# Patient Record
Sex: FEMALE | Race: WHITE | NOT HISPANIC OR LATINO | Employment: FULL TIME | URBAN - METROPOLITAN AREA
[De-identification: names, ages, dates, MRNs, and addresses within clinical notes are randomized per-mention and may not be internally consistent; named-entity substitution may affect disease eponyms.]

---

## 2017-01-27 ENCOUNTER — OFFICE VISIT (OUTPATIENT)
Dept: DERMATOLOGY | Facility: CLINIC | Age: 57
End: 2017-01-27
Payer: COMMERCIAL

## 2017-01-27 DIAGNOSIS — L23.9 ALLERGIC CONTACT DERMATITIS, UNSPECIFIED TRIGGER: ICD-10-CM

## 2017-01-27 PROCEDURE — 99214 OFFICE O/P EST MOD 30 MIN: CPT | Mod: S$GLB,,, | Performed by: DERMATOLOGY

## 2017-01-27 PROCEDURE — 99999 PR PBB SHADOW E&M-EST. PATIENT-LVL II: CPT | Mod: PBBFAC,,, | Performed by: DERMATOLOGY

## 2017-01-27 PROCEDURE — 1159F MED LIST DOCD IN RCRD: CPT | Mod: S$GLB,,, | Performed by: DERMATOLOGY

## 2017-01-27 NOTE — ASSESSMENT & PLAN NOTE
Today's Plan:      Possible complication with atopic dermatitis  Flared on hands and abdomen  Pt encouraged to stick to safe list of products  Cont locoid oint for face  Needs topical steroid for abdomen and hands - will look up compatible with pt's CARD  Pt to bring safe list to next appt.   Consider MTX    F/u 1 month

## 2017-01-27 NOTE — PROGRESS NOTES
Subjective:       Patient ID:  Selene Espinal is a 56 y.o. female who presents for   Chief Complaint   Patient presents with    Dermatitis     face improving occ flare few spots, seem more on hands and palms few weeks now and very itchy, abdomen x few weeks itchy still using hydro butyrate 2-3 times each day which is helping      HPI Comments: S/p patch testing 9/23/16:     + 4 neomycin and chlorhexidine  +1 dimethylaminopropylamine (DMAPA) and quarternium 15  + 1 - +2 methyldibromo glutaronitrile/phenoxyethanol and iodopropynyl butylcarbamate and oleamidopropyl dimethylamine and coconut diethanolamide (cocamide haleigh)  +2 - +3 cobalt and formaldehyde  +3 decyl glucoside     Has eliminated some, but not all allergens    Dermatitis  - Follow-up  Diagnosis: ACD.  Symptom course: worsening (flaring shaggy on hands and abdomen)  Currently using: locoid oint bid to face and hands.  Affected locations: face, left hand, right hand and abdomen  Signs / symptoms: itching (severe)        Review of Systems   HENT: Positive for congestion and postnasal drip.    Eyes: Positive for itching.   Skin: Positive for itching and rash.   Hematologic/Lymphatic: Does not bruise/bleed easily.   Allergic/Immunologic: Positive for environmental allergies (take antihis regularly -- skin gets worse when allergies flare).        Objective:    Physical Exam   Constitutional: She appears well-developed and well-nourished. She is obese.  No distress.   Neurological: She is alert and oriented to person, place, and time. She is not disoriented.   Psychiatric: She has a normal mood and affect.   Skin:   Areas Examined (abnormalities noted in diagram):   Head / Face Inspection Performed  Neck Inspection Performed  Chest / Axilla Inspection Performed  Abdomen Inspection Performed  Back Inspection Performed  RUE Inspected  LUE Inspection Performed  Nails and Digits Inspection Performed                       Diagram Legend     Erythematous scaling  macule/papule c/w actinic keratosis       Vascular papule c/w angioma      Pigmented verrucoid papule/plaque c/w seborrheic keratosis      Yellow umbilicated papule c/w sebaceous hyperplasia      Irregularly shaped tan macule c/w lentigo     1-2 mm smooth white papules consistent with Milia      Movable subcutaneous cyst with punctum c/w epidermal inclusion cyst      Subcutaneous movable cyst c/w pilar cyst      Firm pink to brown papule c/w dermatofibroma      Pedunculated fleshy papule(s) c/w skin tag(s)      Evenly pigmented macule c/w junctional nevus     Mildly variegated pigmented, slightly irregular-bordered macule c/w mildly atypical nevus      Flesh colored to evenly pigmented papule c/w intradermal nevus       Pink pearly papule/plaque c/w basal cell carcinoma      Erythematous hyperkeratotic cursted plaque c/w SCC      Surgical scar with no sign of skin cancer recurrence      Open and closed comedones      Inflammatory papules and pustules      Verrucoid papule consistent consistent with wart     Erythematous eczematous patches and plaques     Dystrophic onycholytic nail with subungual debris c/w onychomycosis     Umbilicated papule    Erythematous-base heme-crusted tan verrucoid plaque consistent with inflamed seborrheic keratosis     Erythematous Silvery Scaling Plaque c/w Psoriasis     See annotation      Assessment / Plan:        Allergic contact dermatitis, unspecified trigger  -     CBC auto differential; Future  -     Comprehensive metabolic panel; Future  -     Quantiferon Gold TB; Future  -     Hepatitis C antibody; Future  -     Hepatitis B surface antigen; Future  -     Hepatitis B surface antibody; Future    Allergic contact dermatitis  Today's Plan:      Possible complication with atopic dermatitis  Flared on hands and abdomen  Pt encouraged to stick to safe list of products  Cont locoid oint for face  Needs topical steroid for abdomen and hands - will look up compatible with pt's CARD  Pt to  bring safe list to next appt.   Consider MTX    F/u 1 month        Return in about 4 weeks (around 2/24/2017).

## 2017-03-09 ENCOUNTER — OFFICE VISIT (OUTPATIENT)
Dept: DERMATOLOGY | Facility: CLINIC | Age: 57
End: 2017-03-09
Payer: COMMERCIAL

## 2017-03-09 ENCOUNTER — LAB VISIT (OUTPATIENT)
Dept: LAB | Facility: HOSPITAL | Age: 57
End: 2017-03-09
Attending: DERMATOLOGY
Payer: COMMERCIAL

## 2017-03-09 DIAGNOSIS — L23.9 ALLERGIC CONTACT DERMATITIS, UNSPECIFIED TRIGGER: ICD-10-CM

## 2017-03-09 PROCEDURE — 99999 PR PBB SHADOW E&M-EST. PATIENT-LVL II: CPT | Mod: PBBFAC,,, | Performed by: DERMATOLOGY

## 2017-03-09 PROCEDURE — 99214 OFFICE O/P EST MOD 30 MIN: CPT | Mod: S$GLB,,, | Performed by: DERMATOLOGY

## 2017-03-09 PROCEDURE — 1160F RVW MEDS BY RX/DR IN RCRD: CPT | Mod: S$GLB,,, | Performed by: DERMATOLOGY

## 2017-03-09 PROCEDURE — 86480 TB TEST CELL IMMUN MEASURE: CPT

## 2017-03-09 RX ORDER — CLOBETASOL PROPIONATE 0.5 MG/G
OINTMENT TOPICAL
Qty: 60 G | Refills: 3 | Status: SHIPPED | OUTPATIENT
Start: 2017-03-09

## 2017-03-09 RX ORDER — HYDROCORTISONE BUTYRATE 1 MG/G
OINTMENT TOPICAL
Qty: 60 G | Refills: 3 | Status: SHIPPED | OUTPATIENT
Start: 2017-03-09

## 2017-03-09 RX ORDER — HYDROXYZINE HYDROCHLORIDE 25 MG/1
25 TABLET, FILM COATED ORAL NIGHTLY
Qty: 30 TABLET | Refills: 2 | Status: SHIPPED | OUTPATIENT
Start: 2017-03-09 | End: 2017-04-08

## 2017-03-09 RX ORDER — IBUPROFEN 800 MG/1
TABLET ORAL
Refills: 0 | COMMUNITY
Start: 2016-12-05

## 2017-03-09 NOTE — ASSESSMENT & PLAN NOTE
"Today's Plan:      Pt seems to have overlap of ACD and AD. Skin flares with "sinus congestion".  Also with component of LSC/neurotic excoriations - scalp, post neck, left chest and abdomen  Discussed good skin care regimen including using a mild soap and moisturizing cream 1 - 2 times per day. Limit to one bath/shower per day and use lukewarm (not hot) water.     Discussed with patient the importance of not manipulating skin lesions. Trauma often exacerbates condition. Trimming nails is recommended to avoid puncturing skin.     Use ice to relieve intense pruritus.    clob oint for hands bid  Cont locoid oint bid for face  If labs nl, will start MTX 10mg qweek and folic acid qd  Stick to safe list  "

## 2017-03-09 NOTE — MR AVS SNAPSHOT
[facility]  [address]  [facility] [address]  Phone: [phone]  Fax: [phone]                  Selene Espinal   3/9/2017 1:20 PM   Office Visit    Description:  Female : 1960   Provider:  Ana Rocha MD   Department:  [facility]           Reason for Visit     Follow-up           Diagnoses this Visit        Comments    Allergic contact dermatitis, unspecified trigger                To Do List           Goals (5 Years of Data)     None      Follow-Up and Disposition     Return in about 4 weeks (around 2017) for with labs.    Follow-up and Disposition History       These Medications        Disp Refills Start End    clobetasol 0.05% (TEMOVATE) 0.05 % Oint 60 g 3 3/9/2017     AAA hands bid    Pharmacy: [facility] [address] AT [address] [facility] Ph #: [phone]       hydrocortisone butyrate (LOCOID) 0.1 % Oint 60 g 3 3/9/2017     AAA face bid x 1 month    Pharmacy: [facility] [address] AT [address] [facility] Ph #: [phone]       hydrOXYzine HCl (ATARAX) 25 MG tablet 30 tablet 2 3/9/2017 2017    Take 1 tablet (25 mg total) by mouth every evening. Prn pruritus. Do not drive or operate machinery while taking this medicine. - Oral    Pharmacy: [facility] [address] AT [address] [facility] Ph #: [phone]         [facility]     [facility] On Call Nurse Care Line -  Assistance  Registered nurses in the [facility] provide clinical advisement, health education, appointment booking, and other advisory services.  Call for this free service at [phone].             Medications           Message regarding Medications     Verify the changes and/or additions to your medication regime listed below are the same as discussed with your clinician today.  If any of these changes or  additions are incorrect, please notify your healthcare provider.        START taking these NEW medications        Refills    clobetasol 0.05% (TEMOVATE) 0.05 % Oint 3    Sig: AAA hands bid    Class: Normal    hydrOXYzine HCl (ATARAX) 25 MG tablet 2    Sig: Take 1 tablet (25 mg total) by mouth every evening. Prn pruritus. Do not drive or operate machinery while taking this medicine.    Class: Normal    Route: Oral           Verify that the below list of medications is an accurate representation of the medications you are currently taking.  If none reported, the list may be blank. If incorrect, please contact your healthcare provider. Carry this list with you in case of emergency.           Current Medications     atenolol (TENORMIN) 25 MG tablet     hydrocortisone butyrate (LOCOID) 0.1 % Oint AAA face bid x 1 month    hydrOXYzine HCl (ATARAX) 10 MG Tab TK 1 T PO QID PRN    ibuprofen (ADVIL,MOTRIN) 800 MG tablet TK 1 T PO Q 6 TO 8 H PRN INFLAMMATION    losartan (COZAAR) 50 MG tablet TK 1 T PO QD    paroxetine (PAXIL) 40 MG tablet TK 1 T PO QD    SYNTHROID 112 mcg tablet TK 1 T PO QD    clobetasol 0.05% (TEMOVATE) 0.05 % Oint AAA hands bid    hydrOXYzine HCl (ATARAX) 25 MG tablet Take 1 tablet (25 mg total) by mouth every evening. Prn pruritus. Do not drive or operate machinery while taking this medicine.           Clinical Reference Information           Allergies as of 3/9/2017     Sulfa (Sulfonamide Antibiotics)      Immunizations Administered on Date of Encounter - 3/9/2017     None      [facility] Sign-Up     Activating your [facility] account is as easy as 1-2-3!     1) Visit [email or web address], select Sign Up Now, enter this activation code and your date of birth, then select Next.  [email or web address]  Expires: 4/23/2017  2:17 PM      2) Create a username and password to use when you visit [facility] in the future and select a security question in case you lose your password and select Next.    3) Enter your e-mail  address and click Sign Up!    Additional Information  If you have questions, please e-mail [email or web address] or call [phone] to talk to our [facility] staff. Remember, [facility] is NOT to be used for urgent needs. For medical emergencies, dial 911.         Instructions    Discussed good skin care regimen including using a mild soap and moisturizing cream 1 - 2 times per day. Limit to one bath/shower per day and use lukewarm (not hot) water.     Discussed with patient the importance of not manipulating skin lesions. Trauma often exacerbates condition. Trimming nails is recommended to avoid puncturing skin.     Use ice to relieve intense pruritus.    Start zyrtec qdGrabit       Language Assistance Services     ATTENTION: Language assistance services are available, free of charge. Please call [phone].      ATENCIÓN: Si sarah perez, tiene a holder disposición servicios gratuitos de asistencia lingüística. Llame al [phone].     REYNALDO Ý: N?u b?n nói Ti?ng Vi?t, có các d?ch v? h? tr? ngôn ng? mi?n phí dành cho b?n. G?i s? [phone].         [facility] complies with applicable Federal civil rights laws and does not discriminate on the basis of race, color, national origin, age, disability, or sex.

## 2017-03-09 NOTE — PROGRESS NOTES
Subjective:       Patient ID:  Selene Espinal is a 56 y.o. female who presents for   Chief Complaint   Patient presents with    Follow-up     (dermatitis) currently flare x started few weeks ago-face both arms hands abdomen very itchy taking benadryl 1-2 pills per day      HPI Comments: S/p patch testing 9/23/16:     + 4 neomycin and chlorhexidine  +1 dimethylaminopropylamine (DMAPA) and quarternium 15  + 1 - +2 methyldibromo glutaronitrile/phenoxyethanol and iodopropynyl butylcarbamate and oleamidopropyl dimethylamine and coconut diethanolamide (cocamide haleigh)  +2 - +3 cobalt and formaldehyde  +3 decyl glucoside     Has eliminated some, but not all allergens    Dermatitis  - Follow-up  Diagnosis: ACD.  Symptom course: worsening (flaring shaggy on hands and abdomen)  Currently using: locoid oint bid to face and hands.  Affected locations: face, left hand, right hand and abdomen  Signs / symptoms: itching (severe)        Review of Systems   Skin: Positive for itching, rash and activity-related sunscreen use. Negative for sun sensitivity.        Objective:    Physical Exam   Constitutional: She appears well-developed and well-nourished. She is obese.  No distress.   Neurological: She is alert and oriented to person, place, and time. She is not disoriented.   Psychiatric: She has a normal mood and affect.   Skin:   Areas Examined (abnormalities noted in diagram):   Head / Face Inspection Performed  Neck Inspection Performed  Chest / Axilla Inspection Performed  Abdomen Inspection Performed  Back Inspection Performed  RUE Inspected  LUE Inspection Performed  Nails and Digits Inspection Performed                       Diagram Legend     Erythematous scaling macule/papule c/w actinic keratosis       Vascular papule c/w angioma      Pigmented verrucoid papule/plaque c/w seborrheic keratosis      Yellow umbilicated papule c/w sebaceous hyperplasia      Irregularly shaped tan macule c/w lentigo     1-2 mm smooth white papules  "consistent with Milia      Movable subcutaneous cyst with punctum c/w epidermal inclusion cyst      Subcutaneous movable cyst c/w pilar cyst      Firm pink to brown papule c/w dermatofibroma      Pedunculated fleshy papule(s) c/w skin tag(s)      Evenly pigmented macule c/w junctional nevus     Mildly variegated pigmented, slightly irregular-bordered macule c/w mildly atypical nevus      Flesh colored to evenly pigmented papule c/w intradermal nevus       Pink pearly papule/plaque c/w basal cell carcinoma      Erythematous hyperkeratotic cursted plaque c/w SCC      Surgical scar with no sign of skin cancer recurrence      Open and closed comedones      Inflammatory papules and pustules      Verrucoid papule consistent consistent with wart     Erythematous eczematous patches and plaques     Dystrophic onycholytic nail with subungual debris c/w onychomycosis     Umbilicated papule    Erythematous-base heme-crusted tan verrucoid plaque consistent with inflamed seborrheic keratosis     Erythematous Silvery Scaling Plaque c/w Psoriasis     See annotation    Lab Results   Component Value Date    WBC 8.78 03/09/2017    HGB 12.1 03/09/2017    HCT 39.0 03/09/2017    MCV 88 03/09/2017     03/09/2017         Assessment / Plan:        Allergic contact dermatitis, unspecified trigger/Atopic derm overlap  -     clobetasol 0.05% (TEMOVATE) 0.05 % Oint; AAA hands bid  Dispense: 60 g; Refill: 3  -     hydrocortisone butyrate (LOCOID) 0.1 % Oint; AAA face bid x 1 month  Dispense: 60 g; Refill: 3  -     hydrOXYzine HCl (ATARAX) 25 MG tablet; Take 1 tablet (25 mg total) by mouth every evening. Prn pruritus. Do not drive or operate machinery while taking this medicine.  Dispense: 30 tablet; Refill: 2      Allergic contact dermatitis  Today's Plan:      Pt seems to have overlap of ACD and AD. Skin flares with "sinus congestion".  Also with component of LSC/neurotic excoriations - scalp, post neck, left chest and abdomen  Discussed " good skin care regimen including using a mild soap and moisturizing cream 1 - 2 times per day. Limit to one bath/shower per day and use lukewarm (not hot) water.     Discussed with patient the importance of not manipulating skin lesions. Trauma often exacerbates condition. Trimming nails is recommended to avoid puncturing skin.     Use ice to relieve intense pruritus.    clob oint for hands bid  Cont locoid oint bid for face  If labs nl, will start MTX 10mg qweek and folic acid qd  Stick to safe list      Return in about 4 weeks (around 4/6/2017) for with labs.

## 2017-03-09 NOTE — PATIENT INSTRUCTIONS
Discussed good skin care regimen including using a mild soap and moisturizing cream 1 - 2 times per day. Limit to one bath/shower per day and use lukewarm (not hot) water.     Discussed with patient the importance of not manipulating skin lesions. Trauma often exacerbates condition. Trimming nails is recommended to avoid puncturing skin.     Use ice to relieve intense pruritus.    Start zyrtec qday

## 2017-03-11 LAB
MITOGEN NIL: >10 IU/ML
NIL: 0.03 IU/ML
TB ANTIGEN NIL: 0.01 IU/ML
TB ANTIGEN: 0.04 IU/ML
TB GOLD: NEGATIVE

## 2017-03-12 DIAGNOSIS — L20.9 ATOPIC DERMATITIS, UNSPECIFIED TYPE: Primary | ICD-10-CM

## 2017-03-12 RX ORDER — FOLIC ACID 1 MG/1
TABLET ORAL
Qty: 30 TABLET | Refills: 5 | Status: SHIPPED | OUTPATIENT
Start: 2017-03-12 | End: 2017-10-04 | Stop reason: SDUPTHER

## 2017-03-12 RX ORDER — METHOTREXATE 2.5 MG/1
TABLET ORAL
Qty: 16 TABLET | Refills: 2 | Status: SHIPPED | OUTPATIENT
Start: 2017-03-12 | End: 2017-06-04 | Stop reason: SDUPTHER

## 2017-03-13 ENCOUNTER — TELEPHONE (OUTPATIENT)
Dept: DERMATOLOGY | Facility: CLINIC | Age: 57
End: 2017-03-13

## 2017-03-13 NOTE — TELEPHONE ENCOUNTER
Spoke to pt.Informed pt that Medications MTX-10mg qweek and folic acid qday has been sent to Pharmacy. pt already picked up Rx's x2.Scheduled labs and f/u in 1 month.

## 2017-03-13 NOTE — TELEPHONE ENCOUNTER
----- Message from Ana Rocha MD sent at 3/12/2017 11:21 AM CDT -----  Labs nl. Sent MTX at 10mg qweek and folic acid qday. Needs appt and cbc and lft's in 1 month

## 2017-04-21 ENCOUNTER — LAB VISIT (OUTPATIENT)
Dept: LAB | Facility: HOSPITAL | Age: 57
End: 2017-04-21
Attending: DERMATOLOGY
Payer: COMMERCIAL

## 2017-04-21 ENCOUNTER — OFFICE VISIT (OUTPATIENT)
Dept: DERMATOLOGY | Facility: CLINIC | Age: 57
End: 2017-04-21
Payer: COMMERCIAL

## 2017-04-21 DIAGNOSIS — L20.9 ATOPIC DERMATITIS, UNSPECIFIED TYPE: ICD-10-CM

## 2017-04-21 DIAGNOSIS — Z79.899 ENCOUNTER FOR LONG-TERM (CURRENT) USE OF HIGH-RISK MEDICATION: Primary | ICD-10-CM

## 2017-04-21 DIAGNOSIS — L23.9 ALLERGIC CONTACT DERMATITIS, UNSPECIFIED TRIGGER: ICD-10-CM

## 2017-04-21 LAB
ALBUMIN SERPL BCP-MCNC: 3.8 G/DL
ALP SERPL-CCNC: 70 U/L
ALT SERPL W/O P-5'-P-CCNC: 21 U/L
AST SERPL-CCNC: 16 U/L
BASOPHILS # BLD AUTO: 0.01 K/UL
BASOPHILS NFR BLD: 0.1 %
BILIRUB DIRECT SERPL-MCNC: 0.1 MG/DL
BILIRUB SERPL-MCNC: 0.2 MG/DL
DIFFERENTIAL METHOD: ABNORMAL
EOSINOPHIL # BLD AUTO: 0.1 K/UL
EOSINOPHIL NFR BLD: 1.3 %
ERYTHROCYTE [DISTWIDTH] IN BLOOD BY AUTOMATED COUNT: 15.1 %
HCT VFR BLD AUTO: 36.1 %
HGB BLD-MCNC: 11.7 G/DL
LYMPHOCYTES # BLD AUTO: 2.7 K/UL
LYMPHOCYTES NFR BLD: 39.6 %
MCH RBC QN AUTO: 27.9 PG
MCHC RBC AUTO-ENTMCNC: 32.4 %
MCV RBC AUTO: 86 FL
MONOCYTES # BLD AUTO: 0.4 K/UL
MONOCYTES NFR BLD: 5.7 %
NEUTROPHILS # BLD AUTO: 3.7 K/UL
NEUTROPHILS NFR BLD: 53 %
PLATELET # BLD AUTO: 289 K/UL
PMV BLD AUTO: 10.2 FL
PROT SERPL-MCNC: 7.7 G/DL
RBC # BLD AUTO: 4.2 M/UL
WBC # BLD AUTO: 6.9 K/UL

## 2017-04-21 PROCEDURE — 85025 COMPLETE CBC W/AUTO DIFF WBC: CPT

## 2017-04-21 PROCEDURE — 99999 PR PBB SHADOW E&M-EST. PATIENT-LVL II: CPT | Mod: PBBFAC,,, | Performed by: DERMATOLOGY

## 2017-04-21 PROCEDURE — 80076 HEPATIC FUNCTION PANEL: CPT

## 2017-04-21 PROCEDURE — 1160F RVW MEDS BY RX/DR IN RCRD: CPT | Mod: S$GLB,,, | Performed by: DERMATOLOGY

## 2017-04-21 PROCEDURE — 99214 OFFICE O/P EST MOD 30 MIN: CPT | Mod: S$GLB,,, | Performed by: DERMATOLOGY

## 2017-04-21 PROCEDURE — 36415 COLL VENOUS BLD VENIPUNCTURE: CPT

## 2017-04-21 RX ORDER — METHOTREXATE 2.5 MG/1
TABLET ORAL
Qty: 16 TABLET | Refills: 2 | Status: SHIPPED | OUTPATIENT
Start: 2017-04-21 | End: 2017-08-22 | Stop reason: SDUPTHER

## 2017-04-21 NOTE — PROGRESS NOTES
Subjective:       Patient ID:  Selene Espinal is a 56 y.o. female who presents for   Chief Complaint   Patient presents with    Dermatitis     follow up     HPI Comments: Also with h/o neurotic excoriations on abdomen. BRAYDON MCFADDEN helped on chest and scalp    Pt continues to pick 2/2 work stress    Dermatitis  - Follow-up  Diagnosis: AD with ACD.  Symptom course: improving  Currently using: clob oint for hands. not using topicals on face. started MTX 10mg qweek.  Affected locations: left hand  Signs / symptoms: itching (minimal on hands)        Review of Systems   Skin: Positive for rash and activity-related sunscreen use. Negative for itching and sun sensitivity.   Psychiatric/Behavioral: Positive for high stress (on paxil x years).        Objective:    Physical Exam   Constitutional: She appears well-developed and well-nourished. She is obese.  No distress.   Neurological: She is alert and oriented to person, place, and time. She is not disoriented.   Psychiatric: She has a normal mood and affect.   Skin:   Areas Examined (abnormalities noted in diagram):   Head / Face Inspection Performed  Neck Inspection Performed  Chest / Axilla Inspection Performed  Back Inspection Performed  RUE Inspected  LUE Inspection Performed                       Diagram Legend     Erythematous scaling macule/papule c/w actinic keratosis       Vascular papule c/w angioma      Pigmented verrucoid papule/plaque c/w seborrheic keratosis      Yellow umbilicated papule c/w sebaceous hyperplasia      Irregularly shaped tan macule c/w lentigo     1-2 mm smooth white papules consistent with Milia      Movable subcutaneous cyst with punctum c/w epidermal inclusion cyst      Subcutaneous movable cyst c/w pilar cyst      Firm pink to brown papule c/w dermatofibroma      Pedunculated fleshy papule(s) c/w skin tag(s)      Evenly pigmented macule c/w junctional nevus     Mildly variegated pigmented, slightly irregular-bordered macule c/w mildly atypical  nevus      Flesh colored to evenly pigmented papule c/w intradermal nevus       Pink pearly papule/plaque c/w basal cell carcinoma      Erythematous hyperkeratotic cursted plaque c/w SCC      Surgical scar with no sign of skin cancer recurrence      Open and closed comedones      Inflammatory papules and pustules      Verrucoid papule consistent consistent with wart     Erythematous eczematous patches and plaques     Dystrophic onycholytic nail with subungual debris c/w onychomycosis     Umbilicated papule    Erythematous-base heme-crusted tan verrucoid plaque consistent with inflamed seborrheic keratosis     Erythematous Silvery Scaling Plaque c/w Psoriasis     See annotation    Lab Results   Component Value Date    ALT 21 04/21/2017    AST 16 04/21/2017    ALKPHOS 70 04/21/2017    BILITOT 0.2 04/21/2017     Lab Results   Component Value Date    WBC 6.90 04/21/2017    HGB 11.7 (L) 04/21/2017    HCT 36.1 (L) 04/21/2017    MCV 86 04/21/2017     04/21/2017       Assessment / Plan:        Allergic contact dermatitis, unspecified trigger  -     CBC auto differential; Future  -     methotrexate 2.5 MG Tab; Take 4 tablets po qweek in divided doses as directed  Dispense: 16 tablet; Refill: 2      Allergic contact dermatitis  Today's Plan:      Improved  Cont MTX at 10mg qweek. Cont folic acid  Cont allergen avoidance  D/c picking at skin  Cont clob oint for hands and   Cont locoid oint bid prn flare to face    Needs cbc in 1 month. Needs f/u with me with labs in 2 months        Return in about 2 months (around 6/21/2017) for with labs.

## 2017-04-21 NOTE — ASSESSMENT & PLAN NOTE
Today's Plan:      Improved  Cont MTX at 10mg qweek. Cont folic acid  Cont allergen avoidance  D/c picking at skin  Cont clob oint for hands and   Cont locoid oint bid prn flare to face  Rec hand cream off of CARD list    Needs cbc in 1 month. Needs f/u with me with labs in 2 months

## 2017-06-04 DIAGNOSIS — L20.9 ATOPIC DERMATITIS, UNSPECIFIED TYPE: ICD-10-CM

## 2017-06-04 RX ORDER — METHOTREXATE 2.5 MG/1
TABLET ORAL
Qty: 16 TABLET | Refills: 1 | Status: SHIPPED | OUTPATIENT
Start: 2017-06-04 | End: 2017-12-27 | Stop reason: SDUPTHER

## 2017-06-06 ENCOUNTER — TELEPHONE (OUTPATIENT)
Dept: DERMATOLOGY | Facility: CLINIC | Age: 57
End: 2017-06-06

## 2017-06-06 NOTE — TELEPHONE ENCOUNTER
LVM for pt to return call to set up labs/or did pt have them done else where outside [facility], results will need to be fax prior to pt follow up visit with Dr. Rocha.

## 2017-06-12 ENCOUNTER — TELEPHONE (OUTPATIENT)
Dept: DERMATOLOGY | Facility: CLINIC | Age: 57
End: 2017-06-12

## 2017-06-12 NOTE — TELEPHONE ENCOUNTER
----- Message from Jennifer Leigh sent at 6/12/2017  9:24 AM CDT -----  Contact: pt   JAM-pt- pt is returning Tabatha's call pt is asking if they received her blood work. Pt said it was faxed over. Can you please call pt at [phone]    MADELIN

## 2017-06-12 NOTE — TELEPHONE ENCOUNTER
Spoke to pt.lab results will be refax today, we have not received recent labs from pt, labs are done outside [facility].

## 2017-07-14 ENCOUNTER — OFFICE VISIT (OUTPATIENT)
Dept: DERMATOLOGY | Facility: CLINIC | Age: 57
End: 2017-07-14
Payer: COMMERCIAL

## 2017-07-14 DIAGNOSIS — L28.1 PRURIGO NODULARIS: ICD-10-CM

## 2017-07-14 DIAGNOSIS — L23.9 ALLERGIC CONTACT DERMATITIS, UNSPECIFIED TRIGGER: ICD-10-CM

## 2017-07-14 DIAGNOSIS — L20.9 ATOPIC DERMATITIS, UNSPECIFIED TYPE: Primary | ICD-10-CM

## 2017-07-14 PROCEDURE — 99999 PR PBB SHADOW E&M-EST. PATIENT-LVL II: CPT | Mod: PBBFAC,,, | Performed by: DERMATOLOGY

## 2017-07-14 PROCEDURE — 99214 OFFICE O/P EST MOD 30 MIN: CPT | Mod: 25,S$GLB,, | Performed by: DERMATOLOGY

## 2017-07-14 PROCEDURE — 11900 INJECT SKIN LESIONS </W 7: CPT | Mod: S$GLB,,, | Performed by: DERMATOLOGY

## 2017-07-14 RX ORDER — DUPILUMAB 300 MG/2ML
300 INJECTION, SOLUTION SUBCUTANEOUS
Qty: 4 ML | Refills: 3 | Status: SHIPPED | OUTPATIENT
Start: 2017-07-14 | End: 2017-10-31

## 2017-07-14 RX ORDER — DUPILUMAB 300 MG/2ML
INJECTION, SOLUTION SUBCUTANEOUS
Qty: 8 ML | Refills: 0 | Status: SHIPPED | OUTPATIENT
Start: 2017-07-14 | End: 2017-10-31

## 2017-07-14 NOTE — ASSESSMENT & PLAN NOTE
Today's Plan:      Clinical picture today more c/w atopic dermatitis  Itch score: 10/10  Sleep score: 0/10 (takes atarax prior to sleep)    Cont MTX at 10mg qweek and folic acid qday  Start Dupixent 600mg SQ day 1 then 300mg SQ qoweek  Cont topicals - clob oint and locoid oint    F/u 1 month

## 2017-07-14 NOTE — PROGRESS NOTES
Subjective:       Patient ID:  Selene Espinal is a 56 y.o. female who presents for   Chief Complaint   Patient presents with    Follow-up     allergic contact derm-both arms itching x few weeks, recheck abdomen currently MTX      Dermatitis  - Follow-up  Diagnosis: AD with ACD.  Symptom course: improving (inital ulcerated lesions to abdomen and dermatitis to hands resolved- new rash to bilateral forearms x3 weeks)  Currently using: clob ointment to hands and forearms. Currently on MTX 10mgqweek and Atarax qohs.  Affected locations: left arm, right arm, abdomen and face  Signs / symptoms: itching, redness and scabs  Severity: mild        Review of Systems   Skin: Positive for itching (Bilateral forearms), rash and activity-related sunscreen use. Negative for daily sunscreen use and recent sunburn.   Hematologic/Lymphatic: Does not bruise/bleed easily.        Objective:    Physical Exam   Constitutional: She appears well-developed and well-nourished. She is obese.  No distress.   Neurological: She is alert and oriented to person, place, and time. She is not disoriented.   Psychiatric: She has a normal mood and affect.   Skin:   Areas Examined (abnormalities noted in diagram):   Head / Face Inspection Performed  Neck Inspection Performed  Chest / Axilla Inspection Performed  Abdomen Inspection Performed  Back Inspection Performed  RUE Inspected  LUE Inspection Performed                       Diagram Legend     Erythematous scaling macule/papule c/w actinic keratosis       Vascular papule c/w angioma      Pigmented verrucoid papule/plaque c/w seborrheic keratosis      Yellow umbilicated papule c/w sebaceous hyperplasia      Irregularly shaped tan macule c/w lentigo     1-2 mm smooth white papules consistent with Milia      Movable subcutaneous cyst with punctum c/w epidermal inclusion cyst      Subcutaneous movable cyst c/w pilar cyst      Firm pink to brown papule c/w dermatofibroma      Pedunculated fleshy papule(s)  c/w skin tag(s)      Evenly pigmented macule c/w junctional nevus     Mildly variegated pigmented, slightly irregular-bordered macule c/w mildly atypical nevus      Flesh colored to evenly pigmented papule c/w intradermal nevus       Pink pearly papule/plaque c/w basal cell carcinoma      Erythematous hyperkeratotic cursted plaque c/w SCC      Surgical scar with no sign of skin cancer recurrence      Open and closed comedones      Inflammatory papules and pustules      Verrucoid papule consistent consistent with wart     Erythematous eczematous patches and plaques     Dystrophic onycholytic nail with subungual debris c/w onychomycosis     Umbilicated papule    Erythematous-base heme-crusted tan verrucoid plaque consistent with inflamed seborrheic keratosis     Erythematous Silvery Scaling Plaque c/w Psoriasis     See annotation    Lab Results   Component Value Date    WBC 6.90 04/21/2017    HGB 11.7 (L) 04/21/2017    HCT 36.1 (L) 04/21/2017    MCV 86 04/21/2017     04/21/2017         Assessment / Plan:        Atopic dermatitis, unspecified type  -     DUPIXENT 300 mg/2 mL Syrg; Inject 600mg SQ day 1 then 300mg qoweek  Dispense: 8 mL; Refill: 0  -     DUPIXENT 300 mg/2 mL Syrg; Inject 300 mg into the skin every 14 (fourteen) days.  Dispense: 4 mL; Refill: 3    Allergic contact dermatitis, unspecified trigger    Prurigo nodularis  Intralesional Kenalog 10mg/cc (0.8 cc total) injected into 4 lesions on the upper right back and abdomen and right forearm today after obtaining verbal consent including risk of surrounding hypopigmentation. Patient tolerated procedure well.        Allergic contact dermatitis  Today's Plan:      Clinical picture today more c/w atopic dermatitis  Itch score: 10/10  Sleep score: 0/10 (takes atarax prior to sleep)    Cont MTX at 10mg qweek and folic acid qday  Start Dupixent 600mg SQ day 1 then 300mg SQ qoweek  Cont topicals - clob oint and locoid oint    F/u 1 month      Atopic  dermatitis  Today's Plan:      Clinical picture today more c/w atopic dermatitis  Itch score: 10/10  Sleep score: 0/10 (takes atarax prior to sleep)    Cont MTX at 10mg qweek and folic acid qday  Start Dupixent 600mg SQ day 1 then 300mg SQ qoweek  Cont topicals - clob oint and locoid oint          Return in about 4 weeks (around 8/11/2017) for with labs.

## 2017-07-14 NOTE — ASSESSMENT & PLAN NOTE
Today's Plan:      Clinical picture today more c/w atopic dermatitis  Itch score: 10/10  Sleep score: 0/10 (takes atarax prior to sleep)    Cont MTX at 10mg qweek and folic acid qday  Start Dupixent 600mg SQ day 1 then 300mg SQ qoweek  Cont topicals - clob oint and locoid oint

## 2017-07-17 ENCOUNTER — TELEPHONE (OUTPATIENT)
Dept: PHARMACY | Facility: CLINIC | Age: 57
End: 2017-07-17

## 2017-07-17 NOTE — TELEPHONE ENCOUNTER
Good morning, this is [facility], we have received an order for Dupixent from your provider and will contact you once a benefits investigation is complete with copay information to set up pickup or shipment and [facility] consultation.  feel free to give us a call with  any questions prior to hearing back from us at [phone]. thank you!_SARI 7/17/17

## 2017-07-18 NOTE — ADDENDUM NOTE
Encounter addended by: Bonny Menchaca on: 7/18/2017  3:08 PM<BR>    Actions taken: Sign clinical note

## 2017-07-18 NOTE — TELEPHONE ENCOUNTER
[employer] [place] stated that they will not accept a PA request on a drug until it has been on the market for at least 3 to 6 months so we should try to get MD to change med.

## 2017-08-02 NOTE — TELEPHONE ENCOUNTER
"Good afternoon,    With Ms. Espinal's insurance ([facility]) a prior authorization was not able to be completed (for Dupexant coverage) due to the medication not being on the market for > 6 months. An appeal was completed instead and was denied. The denial stated: "the request is denied. The medication is currently non covered under the member's contract benefits." With no next step listed to re-attempt at coverage. The  also does not have any type of assistance available at this time.     Please advise how you would like us to proceed.    Thanks,   Zahida Barbosa, PharmD  [facility]   "

## 2017-08-22 DIAGNOSIS — L23.9 ALLERGIC CONTACT DERMATITIS, UNSPECIFIED TRIGGER: ICD-10-CM

## 2017-08-22 RX ORDER — METHOTREXATE 2.5 MG/1
TABLET ORAL
Qty: 16 TABLET | Refills: 1 | Status: SHIPPED | OUTPATIENT
Start: 2017-08-22 | End: 2017-12-21 | Stop reason: SDUPTHER

## 2017-09-01 ENCOUNTER — TELEPHONE (OUTPATIENT)
Dept: DERMATOLOGY | Facility: CLINIC | Age: 57
End: 2017-09-01

## 2017-09-01 NOTE — TELEPHONE ENCOUNTER
----- Message from Kylie Freedman sent at 9/1/2017 12:52 PM CDT -----  Contact: Self  Good afternoon,    Pt would like a call back regarding her upcoming appt. Pt stated she forgot about starting the injections in her abdomen.    Pt can be reached at [phone].    Thank you!

## 2017-09-01 NOTE — TELEPHONE ENCOUNTER
Patient called in for update on Dupixent. I informed her that her insurance would not allow a review of the medication until it was on the market for at least 6 months. It was released on 3/28/17 so we can attempt to get Prior Authorization again around 9/29/17. She asked that we please keep her informed of the status.

## 2017-09-05 ENCOUNTER — TELEPHONE (OUTPATIENT)
Dept: DERMATOLOGY | Facility: CLINIC | Age: 57
End: 2017-09-05

## 2017-09-05 NOTE — TELEPHONE ENCOUNTER
----- Message from Ana Rocha MD sent at 9/5/2017  9:17 AM CDT -----  Ok to push it until end of octLazarus CAPONE    ----- Message -----  From: Tabatha Kearney MA  Sent: 9/5/2017   8:58 AM  To: MD Deni Galeano    Mrs. Morton insurance will be able to cover for Dupixent until after September 29th-Medication need to be out on market for at least 6 months.    Pt has labs and f/u scheduled this upcoming Friday, would you like pt to keep appts or be pushed further down after she starts Medication?    Pt only been using topicals creams for the mean time.

## 2017-09-05 NOTE — TELEPHONE ENCOUNTER
Spoke to pt.r/s both appts to the ending of October, due to not starting Medication yet/and ins purposes.Sent reminders to pt.

## 2017-10-02 NOTE — TELEPHONE ENCOUNTER
Yes ma'am, the appeal was resubmitted to [employer] [place] this morning.    We also reached out to the rep for Dupixent and he is seeing if there is anything they can help us with to get this approved and to see what other alternatives are available for her to get the medication.     We will keep you updated.     Thanks,  Zahida Barbosa, PharmD  [facility]

## 2017-10-03 ENCOUNTER — TELEPHONE (OUTPATIENT)
Dept: PHARMACY | Facility: CLINIC | Age: 57
End: 2017-10-03

## 2017-10-03 ENCOUNTER — TELEPHONE (OUTPATIENT)
Dept: DERMATOLOGY | Facility: CLINIC | Age: 57
End: 2017-10-03

## 2017-10-03 NOTE — TELEPHONE ENCOUNTER
----- Message from  Reese sent at 10/3/2017  4:44 PM CDT -----  Contact: pt   NARCISOpt- pt is calling to speak with Tabatha pt said she was suppose to start a new medication pt said her insurance won't pay for it pt needed to ask Tabatha about it if she needs to keep her appt  Can you please call pt at [phone] jc

## 2017-10-03 NOTE — TELEPHONE ENCOUNTER
Spoke with patient, Dr. Rocha wants her to keep scheduled appointment in October with labs.  She is   currently working on prior auth with ins. Company to get Duplixent approved.

## 2017-10-04 DIAGNOSIS — L20.9 ATOPIC DERMATITIS, UNSPECIFIED TYPE: ICD-10-CM

## 2017-10-05 RX ORDER — FOLIC ACID 1 MG/1
TABLET ORAL
Qty: 30 TABLET | Refills: 0 | Status: SHIPPED | OUTPATIENT
Start: 2017-10-05 | End: 2017-11-05 | Stop reason: SDUPTHER

## 2017-10-26 ENCOUNTER — TELEPHONE (OUTPATIENT)
Dept: DERMATOLOGY | Facility: CLINIC | Age: 57
End: 2017-10-26

## 2017-10-26 NOTE — TELEPHONE ENCOUNTER
----- Message from Keila Gracia sent at 10/26/2017  1:14 PM CDT -----  Contact: patient   Please call patient need to reschedule lab and doctor appointment have an emergency waiting on a call from the nurse

## 2017-10-31 ENCOUNTER — TELEPHONE (OUTPATIENT)
Dept: DERMATOLOGY | Facility: CLINIC | Age: 57
End: 2017-10-31

## 2017-10-31 DIAGNOSIS — L20.9 ATOPIC DERMATITIS, UNSPECIFIED TYPE: ICD-10-CM

## 2017-10-31 RX ORDER — DUPILUMAB 300 MG/2ML
INJECTION, SOLUTION SUBCUTANEOUS
Qty: 4 ML | Refills: 2 | Status: SHIPPED | OUTPATIENT
Start: 2017-10-31

## 2017-10-31 RX ORDER — DUPILUMAB 300 MG/2ML
INJECTION, SOLUTION SUBCUTANEOUS
Qty: 8 ML | Refills: 0 | Status: SHIPPED | OUTPATIENT
Start: 2017-10-31

## 2017-10-31 NOTE — TELEPHONE ENCOUNTER
Spoke with patient, notified her that she was approved for the Dupexient.  Reviewed instructions on how to use medication and patient will call when she gets it to verify dosage again.

## 2017-10-31 NOTE — TELEPHONE ENCOUNTER
FOR DOCUMENTATION ONLY:  Dupixent prior authorization approved x 6 months  10/31/17 through 4/30/18  Case ID: [id number]    [facility] not in network. Insurance suggested [facility]. Estimated copay of $200. Will obtain copay card & reach out to patient.

## 2017-10-31 NOTE — TELEPHONE ENCOUNTER
FOR DOCUMENTATION ONLY:  Financial Assistance for Dupixent approved  Source: Copay Card  BIN: [id number]  PCN: PARMINDER  ID: [id number]  Group: [id number]  $0.00 copay    Must use [facility]  Ph: [phone]  Fx: [phone]

## 2017-11-02 ENCOUNTER — TELEPHONE (OUTPATIENT)
Dept: DERMATOLOGY | Facility: CLINIC | Age: 57
End: 2017-11-02

## 2017-11-02 NOTE — TELEPHONE ENCOUNTER
----- Message from Keila Gracia sent at 11/2/2017  8:53 AM CDT -----  Contact: Pharmacy [facility]  called  The Pharmacy called [facility] Need ICD-10 Code for above patient on medication Dupixent-the number to pharmacy is [phone]

## 2017-11-05 DIAGNOSIS — L20.9 ATOPIC DERMATITIS, UNSPECIFIED TYPE: ICD-10-CM

## 2017-11-06 RX ORDER — FOLIC ACID 1 MG/1
TABLET ORAL
Qty: 30 TABLET | Refills: 0 | Status: SHIPPED | OUTPATIENT
Start: 2017-11-06

## 2017-12-18 ENCOUNTER — TELEPHONE (OUTPATIENT)
Dept: DERMATOLOGY | Facility: CLINIC | Age: 57
End: 2017-12-18

## 2017-12-18 NOTE — TELEPHONE ENCOUNTER
----- Message from Saud Bronson sent at 12/18/2017  8:40 AM CST -----  Contact: PT   Pt requesting to speak to staff regarding dupixent injections, states that she has completed her first month and would like to know how much longer she should keep administering, if at all. Please call pt at [phone] for clarity

## 2017-12-18 NOTE — TELEPHONE ENCOUNTER
Spoke to pt.Confirmed Medication instructions been on Dupixent for 1 month now on maintenance dose.Next f/u and labs on 12/29 to f/u.Sent reminder to pt.

## 2017-12-21 DIAGNOSIS — L23.9 ALLERGIC CONTACT DERMATITIS, UNSPECIFIED TRIGGER: ICD-10-CM

## 2017-12-22 RX ORDER — METHOTREXATE 2.5 MG/1
TABLET ORAL
Qty: 16 TABLET | Refills: 0 | Status: SHIPPED | OUTPATIENT
Start: 2017-12-22

## 2017-12-27 DIAGNOSIS — L20.9 ATOPIC DERMATITIS, UNSPECIFIED TYPE: ICD-10-CM

## 2017-12-27 RX ORDER — METHOTREXATE 2.5 MG/1
TABLET ORAL
Qty: 16 TABLET | Refills: 0 | Status: SHIPPED | OUTPATIENT
Start: 2017-12-27 | End: 2018-03-15 | Stop reason: SDUPTHER

## 2017-12-29 ENCOUNTER — LAB VISIT (OUTPATIENT)
Dept: LAB | Facility: HOSPITAL | Age: 57
End: 2017-12-29
Attending: DERMATOLOGY
Payer: COMMERCIAL

## 2017-12-29 ENCOUNTER — OFFICE VISIT (OUTPATIENT)
Dept: DERMATOLOGY | Facility: CLINIC | Age: 57
End: 2017-12-29
Payer: COMMERCIAL

## 2017-12-29 DIAGNOSIS — L20.9 ATOPIC DERMATITIS, UNSPECIFIED TYPE: Primary | ICD-10-CM

## 2017-12-29 DIAGNOSIS — Z79.899 ENCOUNTER FOR LONG-TERM (CURRENT) USE OF HIGH-RISK MEDICATION: ICD-10-CM

## 2017-12-29 DIAGNOSIS — L23.9 ALLERGIC CONTACT DERMATITIS, UNSPECIFIED TRIGGER: ICD-10-CM

## 2017-12-29 DIAGNOSIS — L98.1 NEUROTIC EXCORIATIONS: ICD-10-CM

## 2017-12-29 LAB
ALBUMIN SERPL BCP-MCNC: 4.1 G/DL
ALP SERPL-CCNC: 71 U/L
ALT SERPL W/O P-5'-P-CCNC: 27 U/L
AST SERPL-CCNC: 22 U/L
BASOPHILS # BLD AUTO: 0.05 K/UL
BASOPHILS NFR BLD: 0.6 %
BILIRUB DIRECT SERPL-MCNC: 0.1 MG/DL
BILIRUB SERPL-MCNC: 0.3 MG/DL
DIFFERENTIAL METHOD: ABNORMAL
EOSINOPHIL # BLD AUTO: 0.2 K/UL
EOSINOPHIL NFR BLD: 1.7 %
ERYTHROCYTE [DISTWIDTH] IN BLOOD BY AUTOMATED COUNT: 15.6 %
HCT VFR BLD AUTO: 37.5 %
HGB BLD-MCNC: 12.2 G/DL
IMM GRANULOCYTES # BLD AUTO: 0.03 K/UL
IMM GRANULOCYTES NFR BLD AUTO: 0.3 %
LYMPHOCYTES # BLD AUTO: 3.2 K/UL
LYMPHOCYTES NFR BLD: 36.4 %
MCH RBC QN AUTO: 28.8 PG
MCHC RBC AUTO-ENTMCNC: 32.5 G/DL
MCV RBC AUTO: 89 FL
MONOCYTES # BLD AUTO: 0.7 K/UL
MONOCYTES NFR BLD: 7.9 %
NEUTROPHILS # BLD AUTO: 4.6 K/UL
NEUTROPHILS NFR BLD: 53.1 %
NRBC BLD-RTO: 0 /100 WBC
PLATELET # BLD AUTO: 330 K/UL
PMV BLD AUTO: 10.5 FL
PROT SERPL-MCNC: 8.4 G/DL
RBC # BLD AUTO: 4.23 M/UL
WBC # BLD AUTO: 8.7 K/UL

## 2017-12-29 PROCEDURE — 99999 PR PBB SHADOW E&M-EST. PATIENT-LVL II: CPT | Mod: PBBFAC,,, | Performed by: DERMATOLOGY

## 2017-12-29 PROCEDURE — 80076 HEPATIC FUNCTION PANEL: CPT

## 2017-12-29 PROCEDURE — 85025 COMPLETE CBC W/AUTO DIFF WBC: CPT

## 2017-12-29 PROCEDURE — 99214 OFFICE O/P EST MOD 30 MIN: CPT | Mod: 25,S$GLB,, | Performed by: DERMATOLOGY

## 2017-12-29 PROCEDURE — 36415 COLL VENOUS BLD VENIPUNCTURE: CPT

## 2017-12-29 PROCEDURE — 11900 INJECT SKIN LESIONS </W 7: CPT | Mod: S$GLB,,, | Performed by: DERMATOLOGY

## 2017-12-29 NOTE — ASSESSMENT & PLAN NOTE
Today's Plan:      Cont dupixent 300mg SQ qoweek  Cont MTX at 10mg qweek and folic acid  Ok to use clob oint bid for flares on body; ok to use locoid oint bid for flares on face    F/u 3 months

## 2017-12-29 NOTE — PATIENT INSTRUCTIONS
Discussed with patient the importance of not manipulating skin lesions. Trauma often exacerbates condition. Trimming nails is recommended to avoid puncturing skin.     Use ice to relieve intense pruritus.

## 2017-12-29 NOTE — PROGRESS NOTES
Subjective:       Patient ID:  Selene Espinal is a 57 y.o. female who presents for   Chief Complaint   Patient presents with    Atopic Dermatitis     same, spot on left breast from prev visit currently on Dupixent and MTX     Atopic Dermatitis  - Follow-up  Symptom course: improving  Currently using: mtx 10mg qweek and folic acid and started dupixent 1 month ago.  Affected locations: chest and right arm  Signs / symptoms: itching        Review of Systems   Skin: Positive for itching (mild), rash and activity-related sunscreen use. Negative for daily sunscreen use and recent sunburn.   Hematologic/Lymphatic: Does not bruise/bleed easily.        Objective:    Physical Exam   Constitutional: She appears well-developed and well-nourished. She is obese.  No distress.   Neurological: She is alert and oriented to person, place, and time. She is not disoriented.   Psychiatric: She has a normal mood and affect.   Skin:   Areas Examined (abnormalities noted in diagram):   Head / Face Inspection Performed  Neck Inspection Performed  Chest / Axilla Inspection Performed  Abdomen Inspection Performed  Back Inspection Performed  RUE Inspected  LUE Inspection Performed                   Diagram Legend     Erythematous scaling macule/papule c/w actinic keratosis       Vascular papule c/w angioma      Pigmented verrucoid papule/plaque c/w seborrheic keratosis      Yellow umbilicated papule c/w sebaceous hyperplasia      Irregularly shaped tan macule c/w lentigo     1-2 mm smooth white papules consistent with Milia      Movable subcutaneous cyst with punctum c/w epidermal inclusion cyst      Subcutaneous movable cyst c/w pilar cyst      Firm pink to brown papule c/w dermatofibroma      Pedunculated fleshy papule(s) c/w skin tag(s)      Evenly pigmented macule c/w junctional nevus     Mildly variegated pigmented, slightly irregular-bordered macule c/w mildly atypical nevus      Flesh colored to evenly pigmented papule c/w intradermal  nevus       Pink pearly papule/plaque c/w basal cell carcinoma      Erythematous hyperkeratotic cursted plaque c/w SCC      Surgical scar with no sign of skin cancer recurrence      Open and closed comedones      Inflammatory papules and pustules      Verrucoid papule consistent consistent with wart     Erythematous eczematous patches and plaques     Dystrophic onycholytic nail with subungual debris c/w onychomycosis     Umbilicated papule    Erythematous-base heme-crusted tan verrucoid plaque consistent with inflamed seborrheic keratosis     Erythematous Silvery Scaling Plaque c/w Psoriasis     See annotation    Lab Results   Component Value Date    WBC 8.70 12/29/2017    HGB 12.2 12/29/2017    HCT 37.5 12/29/2017    MCV 89 12/29/2017     12/29/2017     Lab Results   Component Value Date    ALT 27 12/29/2017    AST 22 12/29/2017    ALKPHOS 71 12/29/2017    BILITOT 0.3 12/29/2017       Assessment / Plan:        Atopic dermatitis, unspecified type    Encounter for long-term (current) use of high-risk medication  -     CBC auto differential; Future  -     Hepatic function panel; Future    Neurotic excoriations - left inframammary  D/c manipulation  Intralesional Kenalog 10mg/cc (0.6 cc total) injected into 1 lesions on the left inframammary today after obtaining verbal consent including risk of surrounding hypopigmentation. Patient tolerated procedure well.    NDC for Kenalog 10mg/cc:  1617-2862-82      Atopic dermatitis  Today's Plan:      Cont dupixent 300mg SQ qoweek  Cont MTX at 10mg qweek and folic acid  Ok to use clob oint bid for flares on body; ok to use locoid oint bid for flares on face    F/u 3 months      Return in about 3 months (around 3/29/2018) for with labs.

## 2018-01-30 ENCOUNTER — TELEPHONE (OUTPATIENT)
Dept: DERMATOLOGY | Facility: CLINIC | Age: 58
End: 2018-01-30

## 2018-01-30 NOTE — TELEPHONE ENCOUNTER
Spoke to pt.r/s labs and f/u appts to a later date due to work derick.Confirmed dates and appts times.Sent reminder to pt.

## 2018-01-30 NOTE — TELEPHONE ENCOUNTER
----- Message from ted Leigh sent at 1/30/2018  8:48 AM CST -----  Contact: pt   JAM-pt- pt is calling to speak with the nurse pt needs to change her appt to either March the 5th or March the 6th can you please call pt at [phone]. Pt has to work the day of her appt    MADELIN

## 2018-03-15 DIAGNOSIS — L20.9 ATOPIC DERMATITIS, UNSPECIFIED TYPE: ICD-10-CM

## 2018-03-16 RX ORDER — METHOTREXATE 2.5 MG/1
TABLET ORAL
Qty: 16 TABLET | Refills: 0 | Status: SHIPPED | OUTPATIENT
Start: 2018-03-16

## 2018-04-02 ENCOUNTER — LAB VISIT (OUTPATIENT)
Dept: LAB | Facility: HOSPITAL | Age: 58
End: 2018-04-02
Attending: DERMATOLOGY
Payer: COMMERCIAL

## 2018-04-02 ENCOUNTER — OFFICE VISIT (OUTPATIENT)
Dept: DERMATOLOGY | Facility: CLINIC | Age: 58
End: 2018-04-02
Payer: COMMERCIAL

## 2018-04-02 DIAGNOSIS — L98.1 NEUROTIC EXCORIATIONS: ICD-10-CM

## 2018-04-02 DIAGNOSIS — L20.9 ATOPIC DERMATITIS, UNSPECIFIED TYPE: Primary | ICD-10-CM

## 2018-04-02 DIAGNOSIS — L23.9 ALLERGIC CONTACT DERMATITIS, UNSPECIFIED TRIGGER: ICD-10-CM

## 2018-04-02 DIAGNOSIS — Z79.899 ENCOUNTER FOR LONG-TERM (CURRENT) USE OF HIGH-RISK MEDICATION: ICD-10-CM

## 2018-04-02 LAB
ALBUMIN SERPL BCP-MCNC: 3.5 G/DL
ALP SERPL-CCNC: 59 U/L
ALT SERPL W/O P-5'-P-CCNC: 19 U/L
AST SERPL-CCNC: 14 U/L
BASOPHILS # BLD AUTO: 0.03 K/UL
BASOPHILS NFR BLD: 0.4 %
BILIRUB DIRECT SERPL-MCNC: 0.1 MG/DL
BILIRUB SERPL-MCNC: 0.2 MG/DL
DIFFERENTIAL METHOD: ABNORMAL
EOSINOPHIL # BLD AUTO: 0.2 K/UL
EOSINOPHIL NFR BLD: 2 %
ERYTHROCYTE [DISTWIDTH] IN BLOOD BY AUTOMATED COUNT: 14.7 %
HCT VFR BLD AUTO: 36.2 %
HGB BLD-MCNC: 11.4 G/DL
IMM GRANULOCYTES # BLD AUTO: 0.02 K/UL
IMM GRANULOCYTES NFR BLD AUTO: 0.3 %
LYMPHOCYTES # BLD AUTO: 2.7 K/UL
LYMPHOCYTES NFR BLD: 36.3 %
MCH RBC QN AUTO: 28.7 PG
MCHC RBC AUTO-ENTMCNC: 31.5 G/DL
MCV RBC AUTO: 91 FL
MONOCYTES # BLD AUTO: 0.4 K/UL
MONOCYTES NFR BLD: 4.8 %
NEUTROPHILS # BLD AUTO: 4.1 K/UL
NEUTROPHILS NFR BLD: 56.2 %
NRBC BLD-RTO: 0 /100 WBC
PLATELET # BLD AUTO: 306 K/UL
PMV BLD AUTO: 10.5 FL
PROT SERPL-MCNC: 7 G/DL
RBC # BLD AUTO: 3.97 M/UL
WBC # BLD AUTO: 7.36 K/UL

## 2018-04-02 PROCEDURE — 11900 INJECT SKIN LESIONS </W 7: CPT | Mod: S$GLB,,, | Performed by: DERMATOLOGY

## 2018-04-02 PROCEDURE — 85025 COMPLETE CBC W/AUTO DIFF WBC: CPT

## 2018-04-02 PROCEDURE — 99214 OFFICE O/P EST MOD 30 MIN: CPT | Mod: 25,S$GLB,, | Performed by: DERMATOLOGY

## 2018-04-02 PROCEDURE — 36415 COLL VENOUS BLD VENIPUNCTURE: CPT

## 2018-04-02 PROCEDURE — 99999 PR PBB SHADOW E&M-EST. PATIENT-LVL II: CPT | Mod: PBBFAC,,, | Performed by: DERMATOLOGY

## 2018-04-02 PROCEDURE — 80076 HEPATIC FUNCTION PANEL: CPT

## 2018-04-02 NOTE — PROGRESS NOTES
Subjective:       Patient ID:  Selene Espinal is a 57 y.o. female who presents for   Chief Complaint   Patient presents with    Atopic Dermatitis     follow up     Atopic Dermatitis  - Follow-up, last seen 12/2017  Symptom course: improving - no longer itching  Currently using: mtx 10mg qweek and folic acid and started dupixent 5 month ago.  Affected locations: currently clear  Signs / symptoms: Itching, overall improved    Patient complains of lesion(s)  Location: new rash right periorbital  Duration: 1 day --started after wearing makeup at easter  Symptoms: itching  Relieving factors/Previous treatments: TAC     Pt with h/o multiple contact allergens including Q-15 and formaldehyde            Review of Systems   HENT: Positive for congestion (x 1 month).    Eyes: Negative for itching, eye watering, eye irritation, visual change and eyelid inflammation.   Skin: Positive for rash and activity-related sunscreen use. Negative for itching (none), daily sunscreen use and recent sunburn.   Hematologic/Lymphatic: Does not bruise/bleed easily.        Objective:    Physical Exam   Constitutional: She appears well-developed and well-nourished. She is obese.  No distress.   Neurological: She is alert and oriented to person, place, and time. She is not disoriented.   Psychiatric: She has a normal mood and affect.   Skin:   Areas Examined (abnormalities noted in diagram):   Head / Face Inspection Performed  Neck Inspection Performed  Chest / Axilla Inspection Performed  Abdomen Inspection Performed  Back Inspection Performed  RUE Inspected  LUE Inspection Performed                   Diagram Legend     Erythematous scaling macule/papule c/w actinic keratosis       Vascular papule c/w angioma      Pigmented verrucoid papule/plaque c/w seborrheic keratosis      Yellow umbilicated papule c/w sebaceous hyperplasia      Irregularly shaped tan macule c/w lentigo     1-2 mm smooth white papules consistent with Milia      Movable  subcutaneous cyst with punctum c/w epidermal inclusion cyst      Subcutaneous movable cyst c/w pilar cyst      Firm pink to brown papule c/w dermatofibroma      Pedunculated fleshy papule(s) c/w skin tag(s)      Evenly pigmented macule c/w junctional nevus     Mildly variegated pigmented, slightly irregular-bordered macule c/w mildly atypical nevus      Flesh colored to evenly pigmented papule c/w intradermal nevus       Pink pearly papule/plaque c/w basal cell carcinoma      Erythematous hyperkeratotic cursted plaque c/w SCC      Surgical scar with no sign of skin cancer recurrence      Open and closed comedones      Inflammatory papules and pustules      Verrucoid papule consistent consistent with wart     Erythematous eczematous patches and plaques     Dystrophic onycholytic nail with subungual debris c/w onychomycosis     Umbilicated papule    Erythematous-base heme-crusted tan verrucoid plaque consistent with inflamed seborrheic keratosis     Erythematous Silvery Scaling Plaque c/w Psoriasis     See annotation      Assessment / Plan:        Atopic dermatitis, unspecified type    Allergic contact dermatitis, unspecified trigger - new flare - right periorbital  Cont steroid cream bid until clear    Neurotic excoriations x 1 - left inframammary  D/c manipulation  Intralesional Kenalog 10mg/cc (0.5 cc total) injected into 1 lesions on the left inframammary today after obtaining verbal consent including risk of surrounding hypopigmentation. Patient tolerated procedure well.    Units: 1  NDC for Kenalog 10mg/cc:  6286-6164-82    Atopic dermatitis  Today's Plan:      Cont dupixent qmonth   D/c mtx    F/u 6 months    Allergic contact dermatitis  Today's Plan:      Cont topical steroid for new flare (likely 2/2 mascara) per pt    F/u prn      Follow-up in about 6 months (around 10/2/2018).